# Patient Record
(demographics unavailable — no encounter records)

---

## 2024-11-25 NOTE — HEALTH RISK ASSESSMENT
[Good] : ~his/her~  mood as  good [No] : No [0] : 2) Feeling down, depressed, or hopeless: Not at all (0) [Former] : Former [With Family] : lives with family

## 2024-11-25 NOTE — PHYSICAL EXAM
[No Acute Distress] : no acute distress [Well-Appearing] : well-appearing [Normal Sclera/Conjunctiva] : normal sclera/conjunctiva [EOMI] : extraocular movements intact [Normal Outer Ear/Nose] : the outer ears and nose were normal in appearance [No Respiratory Distress] : no respiratory distress  [No Accessory Muscle Use] : no accessory muscle use [Clear to Auscultation] : lungs were clear to auscultation bilaterally [Normal Rate] : normal rate  [Regular Rhythm] : with a regular rhythm [No Murmur] : no murmur heard [No Edema] : there was no peripheral edema [Soft] : abdomen soft [Non Tender] : non-tender [Non-distended] : non-distended [No Rash] : no rash [Coordination Grossly Intact] : coordination grossly intact [Normal Gait] : normal gait [Speech Grossly Normal] : speech grossly normal [Normal Affect] : the affect was normal [Alert and Oriented x3] : oriented to person, place, and time

## 2024-11-26 NOTE — HISTORY OF PRESENT ILLNESS
[FreeTextEntry1] : CPE [de-identified] : 74yo female with h/o HTN, HLD, osteoporosis, depression, and chronic knee pain presenting for CPE. At last visit, was having N/V and bloating with certain foods including bread, rice, and yucca. TTG was checked and negative. Patient was referred to GI, now s/p endoscopy/colonoscopy. Endoscopy showing grade A esophagitis and small hiatal hernia. Colonoscopy showing multiple sessile polyps, diverticulosis, and internal hemorrhoids. Repeat colon/endo recommended in 3 years.   Today, patient feels well and has no acute complaints. The abdominal pain from her last visit is much better and only intermittently bothers her. She feels the pain tends to flare when her anxiety/depression worsens. Patient reports good mood over the last few weeks, stating that her anxiety/depression are better controlled. She briefly stopped taking her escitalopram approximately 1 month ago because she was feeling better and then re-started it after a few days.

## 2024-11-26 NOTE — ASSESSMENT
[FreeTextEntry1] : 74yo female with h/o HTN, HLD, osteoporosis, depression, and chronic knee pain presenting for CPE  #GERD #Esophagitis -Symptoms greatly improved since last visit -Omeprazole renewed  #HTN -Patient with blood pressure of 160/80 at this visit -On repeat, BP was 145/78 -Patient encouraged to continue taking amlodipine 10mg -Patient stating her blood pressures are typically in systolics of 130s at home -Patient asked to keep a blood pressure log  -Will RTC in 10 weeks to follow-up   #Anxiety #Depression -Counseled patient not to stop/start escitalopram -Patient indicated understanding, will continue with escitalopram 5mg  #HCM -DEXA scan re-ordered. Alendronate re-prescribed -Colonoscopy, endoscopy done recently -Patient stating she had mammogram done last year with normal results. Will defer at this time -Patient received flu shot -RTC in 10 weeks  Case discussed with Dr. Russell

## 2024-11-26 NOTE — HISTORY OF PRESENT ILLNESS
[FreeTextEntry1] : CPE [de-identified] : 74yo female with h/o HTN, HLD, osteoporosis, depression, and chronic knee pain presenting for CPE. At last visit, was having N/V and bloating with certain foods including bread, rice, and yucca. TTG was checked and negative. Patient was referred to GI, now s/p endoscopy/colonoscopy. Endoscopy showing grade A esophagitis and small hiatal hernia. Colonoscopy showing multiple sessile polyps, diverticulosis, and internal hemorrhoids. Repeat colon/endo recommended in 3 years.   Today, patient feels well and has no acute complaints. The abdominal pain from her last visit is much better and only intermittently bothers her. She feels the pain tends to flare when her anxiety/depression worsens. Patient reports good mood over the last few weeks, stating that her anxiety/depression are better controlled. She briefly stopped taking her escitalopram approximately 1 month ago because she was feeling better and then re-started it after a few days.

## 2024-11-26 NOTE — END OF VISIT
[] : Resident [FreeTextEntry3] : discussed bp mgmt, fu 5-10 w phone or in person, depression/anxiety, esophagitis

## 2025-07-23 NOTE — END OF VISIT
[] : Resident [FreeTextEntry3] :  30 minutes of total time was spent on the date of the encounter. This included time for review of medical records and laboratory results, face to face time (including the physical examination counseling and coordination of care), time for patient education, treatment plans, answering questions, communicating with other doctors and for medical record documentation.  The time spent is separate from time spent on separately billed procedures.       [Time Spent: ___ minutes] : I have spent [unfilled] minutes of time on the encounter which excludes teaching and separately reported services.

## 2025-07-23 NOTE — REVIEW OF SYSTEMS
[Skin Rash] : skin rash [Negative] : Psychiatric [de-identified] : L leg - red inflamed skin with poorly demarcated margins, warmth, non pitting edema, non tender, no vesicles/bullae, no regional LND, no fluctuance, no streaking, pos venous stasis/venous insufficiency.

## 2025-07-23 NOTE — ASSESSMENT
[FreeTextEntry1] : 76F PMH HTN, HLD, OP, depression w/ anxiety, presented for left leg swelling and redness.  RTC 5 w  #leg swelling - f/u cbc, cmp, a1c, procal, d dimer, Mg, Phos, coags, REHANA, duplex bl LE - cephalexin 500 mg  QID for 10d  sent   #HTN- check BP, send for BP machine, pt on amlodipine with LE swelling, consider changing amlodipine to losartan #HLD- f/u lipid panel, ASCVD score #OP- dexa 2025, pt requires meds, check if on alendronate, repeat Dexa in 2 y #depression w/ anxiety- GAD7 and PHQ9 next visit #venous insufficiency- compression stockings, elevate leg, PT, skin care  #HCM breast cancer screen mammo 2021 Birads1-->due colon cancer screen 2024, return in 3 y--> 2027 PCV 2019 Tdap 2017 Shingles 2024

## 2025-07-23 NOTE — HISTORY OF PRESENT ILLNESS
[FreeTextEntry8] : 76F PMH HTN, HLD, OP, depression w/ anxiety, presented for left leg swelling and redness.  #leg swelling pt reports worsening L leg swelling, redness, warmth, for the past 3 weeks. Pt denies fever, pain, change in sensation,  or motor strength, calf pain.  PE:  L leg - red inflamed skin with poorly demarcated margins, warmth, non pitting edema, non tender, no vesicles/bullae, no regional LND, no fluctuance, no streaking, pos venous stasis/venous insufficiency.

## 2025-07-23 NOTE — PHYSICAL EXAM
[Normal] : affect was normal and insight and judgment were intact [de-identified] : L leg - red inflamed skin with poorly demarcated margins, warmth, non pitting edema, non tender, no vesicles/bullae, no regional LND, no fluctuance, no streaking, pos venous stasis/venous insufficiency.